# Patient Record
Sex: MALE | Race: WHITE | NOT HISPANIC OR LATINO | URBAN - METROPOLITAN AREA
[De-identification: names, ages, dates, MRNs, and addresses within clinical notes are randomized per-mention and may not be internally consistent; named-entity substitution may affect disease eponyms.]

---

## 2017-11-10 ENCOUNTER — IMPORTED ENCOUNTER (OUTPATIENT)
Dept: URBAN - METROPOLITAN AREA CLINIC 50 | Facility: CLINIC | Age: 72
End: 2017-11-10

## 2018-08-13 ENCOUNTER — IMPORTED ENCOUNTER (OUTPATIENT)
Dept: URBAN - METROPOLITAN AREA CLINIC 50 | Facility: CLINIC | Age: 73
End: 2018-08-13

## 2019-02-18 ENCOUNTER — IMPORTED ENCOUNTER (OUTPATIENT)
Dept: URBAN - METROPOLITAN AREA CLINIC 50 | Facility: CLINIC | Age: 74
End: 2019-02-18

## 2019-08-26 ENCOUNTER — IMPORTED ENCOUNTER (OUTPATIENT)
Dept: URBAN - METROPOLITAN AREA CLINIC 50 | Facility: CLINIC | Age: 74
End: 2019-08-26

## 2019-09-09 ENCOUNTER — IMPORTED ENCOUNTER (OUTPATIENT)
Dept: URBAN - METROPOLITAN AREA CLINIC 50 | Facility: CLINIC | Age: 74
End: 2019-09-09

## 2019-09-09 NOTE — PATIENT DISCUSSION
"""Start Cool compresses both eyes twice a day ."" ""Start Zaditor both eyes twice a day ."" ""Start Lotemax SM both eyes twice a day

## 2020-02-03 ENCOUNTER — IMPORTED ENCOUNTER (OUTPATIENT)
Dept: URBAN - METROPOLITAN AREA CLINIC 50 | Facility: CLINIC | Age: 75
End: 2020-02-03

## 2020-10-12 ENCOUNTER — IMPORTED ENCOUNTER (OUTPATIENT)
Dept: URBAN - METROPOLITAN AREA CLINIC 50 | Facility: CLINIC | Age: 75
End: 2020-10-12

## 2020-10-12 NOTE — PATIENT DISCUSSION
"""Patient has been worked up for possible pace maker implantation.  Episodes of either low blood ""

## 2021-04-12 ENCOUNTER — IMPORTED ENCOUNTER (OUTPATIENT)
Dept: URBAN - METROPOLITAN AREA CLINIC 50 | Facility: CLINIC | Age: 76
End: 2021-04-12

## 2021-04-17 ASSESSMENT — VISUAL ACUITY
OS_CC: 20/25+2
OS_OTHER: 20/100. 20/200.
OS_CC: 20/25-2
OD_BAT: 20/25
OS_CC: J1
OS_CC: 20/25-1
OD_CC: 20/20-2
OD_CC: 20/20-1
OD_OTHER: 20/40-1. 20/60.
OS_CC: J1+@ 16 IN
OS_BAT: 20/50-2
OD_OTHER: 20/25. 20/40.
OD_CC: J1+
OS_PH: 20/40-1
OS_CC: 20/60-1
OD_CC: 20/20
OD_OTHER: 20/30-. 20/60.
OS_BAT: 20/30
OD_CC: J3@ 16 IN
OS_OTHER: 20/50-2. 20/60.
OD_BAT: 20/30-
OD_PH: 20/25
OD_BAT: 20/40-1
OS_CC: J3@ 16 IN
OD_CC: 20/20-1
OS_CC: J1+
OS_OTHER: 20/30. 20/60.
OD_CC: 20/40-1
OS_BAT: 20/100
OD_CC: J1
OD_CC: J1+@ 16 IN

## 2021-04-17 ASSESSMENT — TONOMETRY
OD_IOP_MMHG: 19
OS_IOP_MMHG: 16
OD_IOP_MMHG: 16
OS_IOP_MMHG: 18
OD_IOP_MMHG: 17
OD_IOP_MMHG: 18
OS_IOP_MMHG: 18
OD_IOP_MMHG: 19

## 2021-05-05 ENCOUNTER — BIOMETRY (OUTPATIENT)
Dept: URBAN - METROPOLITAN AREA CLINIC 52 | Facility: CLINIC | Age: 76
End: 2021-05-05

## 2021-05-05 DIAGNOSIS — H25.13: ICD-10-CM

## 2021-05-05 PROCEDURE — 92136 OPHTHALMIC BIOMETRY: CPT

## 2021-05-05 PROCEDURE — TOPOIOL CORNEAL TOPOGRAPHY-PREMIUM IOL

## 2021-05-05 ASSESSMENT — KERATOMETRY
OD_AXISANGLE_DEGREES: 162
OD_K2POWER_DIOPTERS: 41.50
OD_K1POWER_DIOPTERS: 42.25
OS_K1POWER_DIOPTERS: 42.75
OS_K2POWER_DIOPTERS: 41.50
OS_AXISANGLE2_DEGREES: 060
OD_AXISANGLE2_DEGREES: 72
OS_AXISANGLE_DEGREES: 150

## 2021-05-05 NOTE — PATIENT DISCUSSION
PRE-OPERATIVE TESTING: Biometry and IOL calculations performed. Surgeon to review and select with patient at pre-op appointment.

## 2021-05-18 ENCOUNTER — CATARACT PRE-OP (OUTPATIENT)
Dept: URBAN - METROPOLITAN AREA CLINIC 52 | Facility: CLINIC | Age: 76
End: 2021-05-18

## 2021-05-18 DIAGNOSIS — H25.13: ICD-10-CM

## 2021-05-18 DIAGNOSIS — H43.813: ICD-10-CM

## 2021-05-18 PROCEDURE — PREOP PRE OP VISIT

## 2021-05-18 PROCEDURE — 92134 CPTRZ OPH DX IMG PST SGM RTA: CPT

## 2021-05-18 ASSESSMENT — VISUAL ACUITY
OS_SC: 20/60-1
OD_SC: 20/40-1

## 2021-05-18 ASSESSMENT — KERATOMETRY
OD_K2POWER_DIOPTERS: 41.50
OS_K2POWER_DIOPTERS: 41.50
OD_AXISANGLE2_DEGREES: 72
OS_AXISANGLE2_DEGREES: 060
OD_K1POWER_DIOPTERS: 42.25
OD_AXISANGLE_DEGREES: 162
OS_AXISANGLE_DEGREES: 150
OS_K1POWER_DIOPTERS: 42.75

## 2021-05-18 ASSESSMENT — TONOMETRY
OD_IOP_MMHG: 19
OS_IOP_MMHG: 19

## 2021-05-18 NOTE — PATIENT DISCUSSION
LENS OPTION (SUPERIOR): Discussed with patient in detail all available methods and lens options as well as their associated benefits, limitations and out-of-pocket costs. Patient chooses femtosecond laser-assisted cataract surgery with toric monofocal lens implant and understands that reading glasses will still be needed after surgery. The patient also understands that with any IOL there is no guarantee that they will not require glasses to see their best at any distance after surgery. The risks, benefits and alternatives to surgery were explained and all questions were answered.

## 2021-05-18 NOTE — PATIENT DISCUSSION
CATARACT SURGERY PLANNER - TORIC IOL/+FEMTO/ORA: Phacoemulsification with IOL: Eye: OS|DOS: 5/27/21|Model: [id number]|Power: 21.5 (ORA)|Target: PLANO|Femto: YES|Axis: 145|Visc: DUET|Omidria: YES|10% Phenylephrine: YES|Epi-shugarcaine: YES|Phaco Setting: DENSE|BSS+: NO|Trypan Blue: NO|CTR: NO|Olive Tip: NO|Atropine: NO|Pupilloplasty: McIntosh Damme: +/-|Notes: PLAN: [id number] OS, DIBOO OD @ PLANO OU; HX LR GLAUCOMA SUSPECT OU NO GTTS, NO MAC PATH.  Dilated pupil 6mm.

## 2021-05-27 ENCOUNTER — SURGERY/PROCEDURE (OUTPATIENT)
Dept: URBAN - METROPOLITAN AREA SURGERY 16 | Facility: SURGERY | Age: 76
End: 2021-05-27

## 2021-05-27 DIAGNOSIS — H25.12: ICD-10-CM

## 2021-05-27 PROCEDURE — ZA9003FEMT

## 2021-05-27 PROCEDURE — 66982 XCAPSL CTRC RMVL CPLX WO ECP: CPT

## 2021-05-27 ASSESSMENT — KERATOMETRY
OS_AXISANGLE_DEGREES: 150
OD_K2POWER_DIOPTERS: 41.50
OS_K2POWER_DIOPTERS: 41.50
OD_AXISANGLE2_DEGREES: 72
OD_AXISANGLE_DEGREES: 162
OD_K1POWER_DIOPTERS: 42.25
OS_K1POWER_DIOPTERS: 42.75
OS_AXISANGLE2_DEGREES: 060

## 2021-05-27 NOTE — PATIENT DISCUSSION
s/p Cataract surgery. +Manual LRI 40° @ 145°/325°. +Limited Anterior Vitrectomy secondary to posterior capsular tear.

## 2021-05-28 ENCOUNTER — 1 DAY POST-OP (OUTPATIENT)
Dept: URBAN - METROPOLITAN AREA CLINIC 48 | Facility: CLINIC | Age: 76
End: 2021-05-28

## 2021-05-28 DIAGNOSIS — Z98.42: ICD-10-CM

## 2021-05-28 DIAGNOSIS — Z96.1: ICD-10-CM

## 2021-05-28 PROCEDURE — 99024 POSTOP FOLLOW-UP VISIT: CPT

## 2021-05-28 ASSESSMENT — KERATOMETRY
OS_AXISANGLE_DEGREES: 150
OS_K2POWER_DIOPTERS: 41.50
OS_K1POWER_DIOPTERS: 42.75
OS_AXISANGLE2_DEGREES: 060
OD_K1POWER_DIOPTERS: 42.25
OD_AXISANGLE2_DEGREES: 72
OD_K2POWER_DIOPTERS: 41.50
OD_AXISANGLE_DEGREES: 162

## 2021-05-28 ASSESSMENT — TONOMETRY
OS_IOP_MMHG: 12
OS_IOP_MMHG: 32

## 2021-05-28 ASSESSMENT — VISUAL ACUITY: OS_SC: 20/70-1

## 2021-05-28 NOTE — PATIENT DISCUSSION
1 day PO: Patient is doing well post-operatively. The importance of post-op drop compliance was emphasized. Drop schedule reviewed with patient. Restrictions d/w pt including no bending/heavy lifting, wear shield at night. Patient to call if any visual changes or concerns. Follow up as directed.

## 2021-06-01 ENCOUNTER — PRE-OP - (OUTPATIENT)
Dept: URBAN - METROPOLITAN AREA CLINIC 52 | Facility: CLINIC | Age: 76
End: 2021-06-01

## 2021-06-01 DIAGNOSIS — Z96.1: ICD-10-CM

## 2021-06-01 PROCEDURE — 99024 POSTOP FOLLOW-UP VISIT: CPT

## 2021-06-01 ASSESSMENT — KERATOMETRY
OD_AXISANGLE_DEGREES: 162
OS_AXISANGLE2_DEGREES: 060
OD_K1POWER_DIOPTERS: 42.25
OS_AXISANGLE_DEGREES: 150
OD_AXISANGLE2_DEGREES: 72
OD_K2POWER_DIOPTERS: 41.50
OS_K2POWER_DIOPTERS: 41.50
OS_K1POWER_DIOPTERS: 42.75

## 2021-06-01 ASSESSMENT — VISUAL ACUITY
OS_PH: 20/50
OD_SC: 20/25
OS_SC: 20/100

## 2021-06-01 ASSESSMENT — TONOMETRY
OD_IOP_MMHG: 19
OS_IOP_MMHG: 17

## 2021-06-15 ENCOUNTER — PROBLEM (OUTPATIENT)
Dept: URBAN - METROPOLITAN AREA CLINIC 52 | Facility: CLINIC | Age: 76
End: 2021-06-15

## 2021-06-15 DIAGNOSIS — Z96.1: ICD-10-CM

## 2021-06-15 PROCEDURE — 99024 POSTOP FOLLOW-UP VISIT: CPT

## 2021-06-15 ASSESSMENT — VISUAL ACUITY
OD_SC: 20/30-1
OS_PH: 20/60
OS_SC: 20/200
OD_PH: 20/25-1

## 2021-06-15 ASSESSMENT — KERATOMETRY
OS_AXISANGLE2_DEGREES: 060
OD_K2POWER_DIOPTERS: 41.50
OD_AXISANGLE_DEGREES: 162
OS_K2POWER_DIOPTERS: 41.50
OS_AXISANGLE_DEGREES: 150
OD_K1POWER_DIOPTERS: 42.25
OD_AXISANGLE2_DEGREES: 72
OS_K1POWER_DIOPTERS: 42.75

## 2021-06-15 ASSESSMENT — TONOMETRY
OD_IOP_MMHG: 18
OS_IOP_MMHG: 16

## 2021-06-15 NOTE — PATIENT DISCUSSION
IOL slightly subluxed nasally. Will refer patient to Dr. Yesenia Jewell for repositioning vs sutured IOL. Patient to go directly to [facility] - [facility] today.

## 2021-07-27 ENCOUNTER — DILATED FUNDUS EXAM (OUTPATIENT)
Dept: URBAN - METROPOLITAN AREA CLINIC 52 | Facility: CLINIC | Age: 76
End: 2021-07-27

## 2021-07-27 DIAGNOSIS — H35.032: ICD-10-CM

## 2021-07-27 DIAGNOSIS — T85.29XS: ICD-10-CM

## 2021-07-27 DIAGNOSIS — Z98.890: ICD-10-CM

## 2021-07-27 DIAGNOSIS — H27.132: ICD-10-CM

## 2021-07-27 PROCEDURE — 92134 CPTRZ OPH DX IMG PST SGM RTA: CPT

## 2021-07-27 PROCEDURE — 99024 POSTOP FOLLOW-UP VISIT: CPT

## 2021-07-27 RX ORDER — BIMATOPROST 0.1 MG/ML: 1 SOLUTION/ DROPS OPHTHALMIC EVERY EVENING

## 2021-07-27 RX ORDER — LOTEPREDNOL ETABONATE 5 MG/ML
1 SUSPENSION/ DROPS OPHTHALMIC TWICE A DAY
Start: 2021-07-27

## 2021-07-27 RX ORDER — OFLOXACIN 3 MG/ML
1 SOLUTION/ DROPS OPHTHALMIC TWICE A DAY
Start: 2021-07-27

## 2021-07-27 RX ORDER — BRIMONIDINE TARTRATE 2 MG/MG: 1 SOLUTION/ DROPS OPHTHALMIC

## 2021-07-27 ASSESSMENT — VISUAL ACUITY
OS_SC: 20/200
OD_SC: 20/20
OS_PH: 20/80

## 2021-07-27 ASSESSMENT — KERATOMETRY
OS_K2POWER_DIOPTERS: 41.50
OS_AXISANGLE2_DEGREES: 060
OD_AXISANGLE_DEGREES: 162
OD_K2POWER_DIOPTERS: 41.50
OD_K1POWER_DIOPTERS: 42.25
OS_AXISANGLE_DEGREES: 150
OD_AXISANGLE2_DEGREES: 72
OS_K1POWER_DIOPTERS: 42.75

## 2021-07-27 ASSESSMENT — TONOMETRY
OD_IOP_MMHG: 18
OS_IOP_MMHG: 35
OS_IOP_MMHG: 32
OD_IOP_MMHG: 18

## 2021-07-27 NOTE — PATIENT DISCUSSION
Patient instructed to discontinue Pred-Moxi-Nepaf secondary to increased IOP OS. Will Start Lotemax and Ocuflox as directed.

## 2021-07-27 NOTE — PATIENT DISCUSSION
(s/p PPV, Removal RLF, Secondary IOL 06/21/21). s/p cataract surgery 5/27/21. Sutured IOL well positioned followed by Dr. Иван Lubin. Patient to keep follow up as scheduled with Dr. Иван Lubin September 14.

## 2021-07-27 NOTE — PATIENT DISCUSSION
IOL slightly subluxed nasally. Will refer patient to Dr. Mike Sow for repositioning vs sutured IOL. Patient to go directly to [facility] - [facility] today.

## 2021-09-28 ENCOUNTER — COMPREHENSIVE EXAM (OUTPATIENT)
Dept: URBAN - METROPOLITAN AREA CLINIC 52 | Facility: CLINIC | Age: 76
End: 2021-09-28

## 2021-09-28 DIAGNOSIS — H43.392: ICD-10-CM

## 2021-09-28 DIAGNOSIS — Z98.890: ICD-10-CM

## 2021-09-28 DIAGNOSIS — H25.11: ICD-10-CM

## 2021-09-28 DIAGNOSIS — H35.372: ICD-10-CM

## 2021-09-28 DIAGNOSIS — H16.223: ICD-10-CM

## 2021-09-28 DIAGNOSIS — H27.132: ICD-10-CM

## 2021-09-28 DIAGNOSIS — H40.052: ICD-10-CM

## 2021-09-28 DIAGNOSIS — H40.042: ICD-10-CM

## 2021-09-28 DIAGNOSIS — H59.022: ICD-10-CM

## 2021-09-28 PROCEDURE — 92015 DETERMINE REFRACTIVE STATE: CPT

## 2021-09-28 PROCEDURE — 92134 CPTRZ OPH DX IMG PST SGM RTA: CPT

## 2021-09-28 PROCEDURE — 92025 CPTRIZED CORNEAL TOPOGRAPHY: CPT

## 2021-09-28 PROCEDURE — 92014 COMPRE OPH EXAM EST PT 1/>: CPT

## 2021-09-28 ASSESSMENT — KERATOMETRY
OS_K2POWER_DIOPTERS: 41.50
OD_AXISANGLE_DEGREES: 162
OS_AXISANGLE_DEGREES: 150
OS_AXISANGLE2_DEGREES: 060
OD_AXISANGLE2_DEGREES: 72
OD_K1POWER_DIOPTERS: 42.25
OD_K2POWER_DIOPTERS: 41.50
OS_K1POWER_DIOPTERS: 42.75

## 2021-09-28 ASSESSMENT — VISUAL ACUITY
OS_SC: 20/200
OS_PH: 20/80
OD_SC: 20/25
OU_SC: 20/40

## 2021-09-28 ASSESSMENT — TONOMETRY
OD_IOP_MMHG: 18
OS_IOP_MMHG: 20

## 2021-09-28 NOTE — PATIENT DISCUSSION
(s/p PPV, Removal RLF, Secondary IOL 06/21/21). s/p cataract surgery 5/27/21. Sutured IOL well positioned followed by Dr. Alexia Mcmahan.

## 2021-09-28 NOTE — PATIENT DISCUSSION
IOL slightly subluxed nasally. Will refer patient to Dr. Andrew Dodson for repositioning vs sutured IOL. Patient to go directly to [facility] - [facility] today.

## 2022-08-22 ENCOUNTER — COMPREHENSIVE EXAM (OUTPATIENT)
Dept: URBAN - METROPOLITAN AREA CLINIC 52 | Facility: CLINIC | Age: 77
End: 2022-08-22

## 2022-08-22 DIAGNOSIS — H35.372: ICD-10-CM

## 2022-08-22 DIAGNOSIS — H40.052: ICD-10-CM

## 2022-08-22 DIAGNOSIS — H40.042: ICD-10-CM

## 2022-08-22 DIAGNOSIS — H25.11: ICD-10-CM

## 2022-08-22 DIAGNOSIS — H43.813: ICD-10-CM

## 2022-08-22 PROCEDURE — 92015 DETERMINE REFRACTIVE STATE: CPT

## 2022-08-22 PROCEDURE — 92134 CPTRZ OPH DX IMG PST SGM RTA: CPT

## 2022-08-22 PROCEDURE — 92014 COMPRE OPH EXAM EST PT 1/>: CPT

## 2022-08-22 ASSESSMENT — VISUAL ACUITY
OS_CC: 20/50-2
OS_GLARE: 20/70
OD_CC: 20/25
OD_GLARE: 20/30-2
OS_GLARE: 20/100
OU_CC: J1

## 2022-08-22 ASSESSMENT — TONOMETRY
OS_IOP_MMHG: 15
OD_IOP_MMHG: 16

## 2022-08-22 NOTE — PATIENT DISCUSSION
----- Message from Filomena Madera NP sent at 3/10/2022  8:41 AM CST -----  Cholesterol and triglycerides are good and normal.  HDL or good cholesterol is at an optimal level.  Normal kidney and liver function and electrolytes.  Normal glucose.  CBC anemia or infection.   IOL slightly subluxed nasally. Will refer patient to Dr. Zonia Dakin for repositioning vs sutured IOL. Patient to go directly to [facility] - RET[facility] today.

## 2022-08-22 NOTE — PATIENT DISCUSSION
(s/p PPV, Removal RLF, Secondary IOL 06/21/21). s/p cataract surgery 5/27/21. Sutured IOL well positioned followed by Dr. Constantino Brewster.

## 2022-09-26 ENCOUNTER — FOLLOW UP (OUTPATIENT)
Dept: URBAN - METROPOLITAN AREA CLINIC 52 | Facility: CLINIC | Age: 77
End: 2022-09-26

## 2022-09-26 DIAGNOSIS — H40.052: ICD-10-CM

## 2022-09-26 DIAGNOSIS — H40.013: ICD-10-CM

## 2022-09-26 PROCEDURE — 92133 CPTRZD OPH DX IMG PST SGM ON: CPT

## 2022-09-26 PROCEDURE — 92083 EXTENDED VISUAL FIELD XM: CPT

## 2022-09-26 ASSESSMENT — TONOMETRY
OD_IOP_MMHG: 18
OD_IOP_MMHG: 18
OS_IOP_MMHG: 17
OS_IOP_MMHG: 16

## 2022-09-26 ASSESSMENT — VISUAL ACUITY
OD_CC: 20/20
OS_CC: 20/40

## 2022-09-26 NOTE — PATIENT DISCUSSION
(s/p PPV, Removal RLF, Secondary IOL 06/21/21). s/p cataract surgery 5/27/21. Sutured IOL well positioned followed by Dr. Chris Schaeffer.

## 2022-09-26 NOTE — PATIENT DISCUSSION
HVF/OCT(RNFL) done today wnl OD/OS. This was reviewed and discussed with patient at today's exam. IOP wnl, 18/17 (adj). No treatment necessary at this time.

## 2022-09-26 NOTE — PATIENT DISCUSSION
IOL slightly subluxed nasally. Will refer patient to Dr. Aravind Donovan for repositioning vs sutured IOL. Patient to go directly to [facility] - [facility] today.

## 2023-07-10 ENCOUNTER — COMPREHENSIVE EXAM (OUTPATIENT)
Dept: URBAN - METROPOLITAN AREA CLINIC 52 | Facility: CLINIC | Age: 78
End: 2023-07-10

## 2023-07-10 DIAGNOSIS — H25.11: ICD-10-CM

## 2023-07-10 DIAGNOSIS — H40.052: ICD-10-CM

## 2023-07-10 DIAGNOSIS — H43.813: ICD-10-CM

## 2023-07-10 DIAGNOSIS — H43.821: ICD-10-CM

## 2023-07-10 DIAGNOSIS — H35.372: ICD-10-CM

## 2023-07-10 DIAGNOSIS — L71.9: ICD-10-CM

## 2023-07-10 PROCEDURE — 92014 COMPRE OPH EXAM EST PT 1/>: CPT

## 2023-07-10 PROCEDURE — 92134 CPTRZ OPH DX IMG PST SGM RTA: CPT

## 2023-07-10 RX ORDER — ERYTHROMYCIN 5 MG/G
OINTMENT OPHTHALMIC EVERY EVENING
Start: 2023-07-10 | End: 2023-07-24

## 2023-07-10 ASSESSMENT — VISUAL ACUITY
OS_GLARE: 20/30
OD_CC: 20/20
OS_GLARE: 20/60
OD_GLARE: 20/30
OD_GLARE: 20/30
OS_CC: 20/25-2
OU_CC: J1+

## 2023-07-10 ASSESSMENT — TONOMETRY
OD_IOP_MMHG: 16
OS_IOP_MMHG: 16
OD_IOP_MMHG: 16
OS_IOP_MMHG: 17

## 2024-07-22 ENCOUNTER — COMPREHENSIVE EXAM (OUTPATIENT)
Dept: URBAN - METROPOLITAN AREA CLINIC 52 | Facility: CLINIC | Age: 79
End: 2024-07-22

## 2024-07-22 DIAGNOSIS — H35.372: ICD-10-CM

## 2024-07-22 DIAGNOSIS — H43.392: ICD-10-CM

## 2024-07-22 DIAGNOSIS — H35.032: ICD-10-CM

## 2024-07-22 DIAGNOSIS — H43.821: ICD-10-CM

## 2024-07-22 DIAGNOSIS — H40.042: ICD-10-CM

## 2024-07-22 PROCEDURE — 92015 DETERMINE REFRACTIVE STATE: CPT

## 2024-07-22 PROCEDURE — 99214 OFFICE O/P EST MOD 30 MIN: CPT

## 2024-07-22 PROCEDURE — 92134 CPTRZ OPH DX IMG PST SGM RTA: CPT

## 2024-07-22 ASSESSMENT — VISUAL ACUITY
OU_CC: 20/25+2
OD_GLARE: 20/40
OD_GLARE: 20/25
OS_CC: 20/25
OD_CC: 20/25
OU_CC: J1+@16"

## 2024-07-22 ASSESSMENT — TONOMETRY
OS_IOP_MMHG: 16
OS_IOP_MMHG: 17
OD_IOP_MMHG: 16

## 2025-07-28 ENCOUNTER — COMPREHENSIVE EXAM (OUTPATIENT)
Age: 80
End: 2025-07-28

## 2025-07-28 DIAGNOSIS — H25.811: ICD-10-CM

## 2025-07-28 DIAGNOSIS — H43.821: ICD-10-CM

## 2025-07-28 DIAGNOSIS — H16.223: ICD-10-CM

## 2025-07-28 DIAGNOSIS — H43.811: ICD-10-CM

## 2025-07-28 DIAGNOSIS — H40.042: ICD-10-CM

## 2025-07-28 DIAGNOSIS — H40.052: ICD-10-CM

## 2025-07-28 PROCEDURE — 92083 EXTENDED VISUAL FIELD XM: CPT

## 2025-07-28 PROCEDURE — 92133 CPTRZD OPH DX IMG PST SGM ON: CPT

## 2025-07-28 PROCEDURE — 99214 OFFICE O/P EST MOD 30 MIN: CPT
